# Patient Record
Sex: FEMALE | Race: WHITE | NOT HISPANIC OR LATINO | Employment: FULL TIME | URBAN - NONMETROPOLITAN AREA
[De-identification: names, ages, dates, MRNs, and addresses within clinical notes are randomized per-mention and may not be internally consistent; named-entity substitution may affect disease eponyms.]

---

## 2022-07-18 ENCOUNTER — OFFICE VISIT (OUTPATIENT)
Dept: URGENT CARE | Facility: CLINIC | Age: 57
End: 2022-07-18

## 2022-07-18 VITALS
RESPIRATION RATE: 15 BRPM | OXYGEN SATURATION: 97 % | HEIGHT: 66 IN | BODY MASS INDEX: 29.57 KG/M2 | DIASTOLIC BLOOD PRESSURE: 82 MMHG | TEMPERATURE: 99.2 F | WEIGHT: 184 LBS | HEART RATE: 75 BPM | SYSTOLIC BLOOD PRESSURE: 142 MMHG

## 2022-07-18 DIAGNOSIS — S09.90XA INJURY OF HEAD, INITIAL ENCOUNTER: ICD-10-CM

## 2022-07-18 DIAGNOSIS — Z92.29 HX OF LONG TERM USE OF BLOOD THINNERS: ICD-10-CM

## 2022-07-18 DIAGNOSIS — R03.0 ELEVATED BLOOD PRESSURE READING: ICD-10-CM

## 2022-07-18 PROCEDURE — 99204 OFFICE O/P NEW MOD 45 MIN: CPT | Performed by: FAMILY MEDICINE

## 2022-07-18 RX ORDER — PRAVASTATIN SODIUM 20 MG
40 TABLET ORAL NIGHTLY
COMMUNITY

## 2022-07-18 RX ORDER — LEVOTHYROXINE SODIUM 0.15 MG/1
150 TABLET ORAL
COMMUNITY

## 2022-07-18 RX ORDER — BUPROPION HYDROCHLORIDE 150 MG/1
150 TABLET, EXTENDED RELEASE ORAL 2 TIMES DAILY
COMMUNITY

## 2022-07-18 RX ORDER — LISINOPRIL 5 MG/1
2.5 TABLET ORAL DAILY
COMMUNITY

## 2022-07-18 RX ORDER — ASPIRIN 81 MG/1
81 TABLET, CHEWABLE ORAL DAILY
COMMUNITY

## 2022-07-18 RX ORDER — VALACYCLOVIR HYDROCHLORIDE 500 MG/1
1000 TABLET, FILM COATED ORAL 2 TIMES DAILY
COMMUNITY

## 2022-07-18 RX ORDER — INSULIN GLARGINE 100 [IU]/ML
INJECTION, SOLUTION SUBCUTANEOUS EVERY EVENING
COMMUNITY

## 2022-07-18 NOTE — PROGRESS NOTES
"  Subjective:      56 y.o. female presents to urgent care for head injury.  Earlier today she was pushing a large, green garbage bin outside when the wheels got stuck on something and it flipped over, hitting her in the head.  It hit her on the forehead where she now has a small laceration, and swelling.  Currently she endorses a headache as well as blurry vision to her left eye.  She was wearing sunglasses that did not break, so she knows she did not hit her eye.  She denies any loss of consciousness.  She does feel nauseated but has not vomited.  She is on blood thinners, ASA daily, preventatively as she has diabetes.    Blood pressure is elevated today in urgent care.  She does have a history of this for which she takes lisinopril.  Unfortunately she did not take it for the last couple of days as she was traveling. She denies any chest pain, palpitations, shortness of breath.    She denies any other questions or concerns at this time.    Current problem list, medication, and past medical/surgical history were reviewed in Epic.    ROS  See HPI     Objective:      BP (!) 142/82 (BP Location: Right arm, Patient Position: Sitting, BP Cuff Size: Large adult)   Pulse 75   Temp 37.3 °C (99.2 °F) (Temporal)   Resp 15   Ht 1.676 m (5' 6\")   Wt 83.5 kg (184 lb)   SpO2 97%   BMI 29.70 kg/m²     Physical Exam  Constitutional:       General: She is not in acute distress.     Appearance: She is not diaphoretic.   Eyes:      Extraocular Movements: Extraocular movements intact.      Pupils: Pupils are equal, round, and reactive to light.   Cardiovascular:      Rate and Rhythm: Normal rate and regular rhythm.      Heart sounds: Normal heart sounds.   Pulmonary:      Effort: Pulmonary effort is normal. No respiratory distress.      Breath sounds: Normal breath sounds.   Skin:     Comments: Superficial, U shaped laceration to the middle of her forehead, approximately 1 mm in length.  She does have an area of edema that " measures approximately 8 cm x 8 cm again to the center of her forehead.  This is tender to palpation.   Neurological:      Mental Status: She is alert.      Comments: Cranial nerves II through XII grossly intact.  Equal strength and sensation to extremities x4   Psychiatric:         Mood and Affect: Affect normal.         Judgment: Judgment normal.       Assessment/Plan:     1. Injury of head, initial encounter  2. Hx of long term use of blood thinners  Due to her use of blood thinners she is not eligible for screening with the [facility] head injury/trauma rule. At this time, I feel the patient requires a higher level of care in the ED for closer monitoring, stat lab work and/or imaging for further evaluation. This has been discussed with the patient and she states agreement and understanding. The patient is stable without the need for continuous monitoring and therefore will go via private vehicle to [facility] emergency department without delay.    3. Elevated blood pressure reading  Asymptomatic.  Patient encouraged to take her lisinopril as prescribed.  Follow with PCP upon return to [place].      Instructed to return to Urgent Care or nearest Emergency Department if symptoms fail to improve, for any change in condition, further concerns, or new concerning symptoms. Patient states understanding of the plan of care and discharge instructions.    Radha Espinoza M.D.

## 2022-07-18 NOTE — LETTER
July 18, 2022    To Whom It May Concern:         This is confirmation that Cherise Tellez attended her scheduled appointment with Radha Espinoza M.D. on 7/18/22.         If you have any questions please do not hesitate to call me at the phone number listed below.    Sincerely,          Radha Espinoza M.D.  [phone]